# Patient Record
Sex: MALE | URBAN - METROPOLITAN AREA
[De-identification: names, ages, dates, MRNs, and addresses within clinical notes are randomized per-mention and may not be internally consistent; named-entity substitution may affect disease eponyms.]

---

## 2024-04-17 ENCOUNTER — APPOINTMENT (OUTPATIENT)
Dept: URBAN - METROPOLITAN AREA CLINIC 240 | Age: 19
Setting detail: DERMATOLOGY
End: 2024-04-18

## 2024-04-17 DIAGNOSIS — L50.8 OTHER URTICARIA: ICD-10-CM

## 2024-04-17 PROCEDURE — OTHER DEFER: OTHER

## 2024-04-17 PROCEDURE — OTHER MIPS QUALITY: OTHER

## 2024-04-17 PROCEDURE — OTHER ADDITIONAL NOTES: OTHER

## 2024-04-17 PROCEDURE — OTHER COUNSELING: OTHER

## 2024-04-17 PROCEDURE — 99203 OFFICE O/P NEW LOW 30 MIN: CPT

## 2024-04-17 ASSESSMENT — LOCATION SIMPLE DESCRIPTION DERM
LOCATION SIMPLE: LEFT FOREARM
LOCATION SIMPLE: LEFT UPPER BACK
LOCATION SIMPLE: LEFT LOWER BACK
LOCATION SIMPLE: RIGHT UPPER BACK
LOCATION SIMPLE: ABDOMEN

## 2024-04-17 ASSESSMENT — LOCATION DETAILED DESCRIPTION DERM
LOCATION DETAILED: RIGHT INFERIOR UPPER BACK
LOCATION DETAILED: LEFT SUPERIOR LATERAL MIDBACK
LOCATION DETAILED: LEFT VENTRAL PROXIMAL FOREARM
LOCATION DETAILED: LEFT MID-UPPER BACK
LOCATION DETAILED: RIGHT LATERAL ABDOMEN
LOCATION DETAILED: RIGHT MID-UPPER BACK

## 2024-04-17 ASSESSMENT — LOCATION ZONE DERM
LOCATION ZONE: ARM
LOCATION ZONE: TRUNK

## 2024-04-17 NOTE — PROCEDURE: MIPS QUALITY
Quality 130: Documentation Of Current Medications In The Medical Record: Current Medications Documented
Quality 431: Preventive Care And Screening: Unhealthy Alcohol Use - Screening: Patient not identified as an unhealthy alcohol user when screened for unhealthy alcohol use using a systematic screening method
Quality 394c: Hpv Vaccine For Adolescents: Patient did not have at least two HPV vaccines (with at least 146 days between the two) OR three HPV vaccines on or between the patient’s 9th and 13th birthdays.
Quality 394a: Meningococcal Immunizations For Adolescents: Patient had one dose of meningococcal vaccine (serogroups A, C, W, Y) on or between the patient's 11th and 13th birthdays.
Quality 226: Preventive Care And Screening: Tobacco Use: Screening And Cessation Intervention: Patient screened for tobacco use and is an ex/non-smoker
Quality 394b: Td/Tdap Immunizations For Adolescents: Patient had one tetanus, diphtheria toxoids and acellular pertussis vaccine (Tdap) on or between the patient's 10th and 13th birthdays.
Detail Level: Detailed

## 2024-04-17 NOTE — PROCEDURE: ADDITIONAL NOTES
Additional Notes: Patient has had hives erupting on the chest, neck, abdomen, back, arms and legs for 2.5 months. No evidence of angioedema and discussed going to the ER if he has swelling of the lips, tongue or has trouble swallowing, speaking, eating, drinking or is drooling. Patient has an Epi-pen.\\n\\nPatient and mother denied a hx of allergies, asthma, and eczema. No changes have been made to hygiene products, laundry soaps, cologne, food, protein, vitamins, etc. Patient denied any preceding illness or new medications. Patient and mother deny taking NSAIDs. No new animals/pets. He had allergy testing for food a little over a year ago, everything was WNL. \\n\\nHe has been seeing allergist Noa Harrison who ordered labs that were done on 4/10/24 to include: upper respiratory panel, total IGE, C Kit, Tryptase, C3, C4, Ch50, and HAE panel; results still pending. Encouraged patient to provide us with those results once they are final. \\n\\nPatient has tried Oral Prednisone and has been consistently taking Zyrtec 1-3 times a day with no immediate improvement. Today, however, his skin is significantly improved. Lesions appear to be resolving. \\n\\nPatient describes the hives as itchy, but not painful and does believe individual lesions resolve within one day. Patient states he is in sports but exercise does not appear to be a trigger.  \\n\\nDiscussed punch biopsy can be done on a fresh lesion to rule out urticarial vasculitis. Also discussed blood work including CBC with diff, ESR and CRP. Discussed this is likely still acute urticaria and can be treated with around the clock antihistamines and monitoring for possible triggers. \\n\\nRecommend patient f/u in 2 weeks.
Render Risk Assessment In Note?: no
Detail Level: Detailed

## 2024-04-17 NOTE — PROCEDURE: DEFER
Size Of Lesion In Cm (Optional): 0
Introduction Text (Please End With A Colon): The following procedure was deferred: punch biopsy of benign appearing lesion. Recommended a few additional labs on top of what Allergist has ran but patient and mother declined additional lab testing at this time.
Procedure To Be Performed At Next Visit: Biopsy by punch method
Instructions (Optional): Will consider after sports season ends and if he continues to experience hives.
Detail Level: Detailed
Other Procedure: Labs
Reason To Defer Override: patient declines punch biopsy due to being involved in physical sports.